# Patient Record
Sex: MALE | Race: WHITE | Employment: FULL TIME | URBAN - METROPOLITAN AREA
[De-identification: names, ages, dates, MRNs, and addresses within clinical notes are randomized per-mention and may not be internally consistent; named-entity substitution may affect disease eponyms.]

---

## 2018-10-17 ENCOUNTER — APPOINTMENT (OUTPATIENT)
Dept: GENERAL RADIOLOGY | Age: 33
End: 2018-10-17
Payer: COMMERCIAL

## 2018-10-17 ENCOUNTER — HOSPITAL ENCOUNTER (EMERGENCY)
Age: 33
Discharge: HOME | End: 2018-10-17
Attending: EMERGENCY MEDICINE
Payer: COMMERCIAL

## 2018-10-17 VITALS
HEART RATE: 76 BPM | TEMPERATURE: 98.2 F | HEIGHT: 77 IN | BODY MASS INDEX: 21.84 KG/M2 | DIASTOLIC BLOOD PRESSURE: 77 MMHG | WEIGHT: 185 LBS | SYSTOLIC BLOOD PRESSURE: 133 MMHG | RESPIRATION RATE: 16 BRPM | OXYGEN SATURATION: 100 %

## 2018-10-17 DIAGNOSIS — S62.616B OPEN DISPLACED FRACTURE OF PROXIMAL PHALANX OF RIGHT LITTLE FINGER, INITIAL ENCOUNTER: ICD-10-CM

## 2018-10-17 DIAGNOSIS — S62.614A CLOSED DISPLACED FRACTURE OF PROXIMAL PHALANX OF RIGHT RING FINGER, INITIAL ENCOUNTER: Primary | ICD-10-CM

## 2018-10-17 PROCEDURE — 6360000002 HC RX W HCPCS: Performed by: NURSE PRACTITIONER

## 2018-10-17 PROCEDURE — 99283 EMERGENCY DEPT VISIT LOW MDM: CPT

## 2018-10-17 PROCEDURE — 73130 X-RAY EXAM OF HAND: CPT

## 2018-10-17 PROCEDURE — 12001 RPR S/N/AX/GEN/TRNK 2.5CM/<: CPT

## 2018-10-17 PROCEDURE — 90715 TDAP VACCINE 7 YRS/> IM: CPT | Performed by: NURSE PRACTITIONER

## 2018-10-17 PROCEDURE — 90471 IMMUNIZATION ADMIN: CPT | Performed by: NURSE PRACTITIONER

## 2018-10-17 RX ORDER — HYDROCODONE BITARTRATE AND ACETAMINOPHEN 5; 325 MG/1; MG/1
2 TABLET ORAL ONCE
Status: DISCONTINUED | OUTPATIENT
Start: 2018-10-17 | End: 2018-10-17

## 2018-10-17 RX ORDER — LIDOCAINE HYDROCHLORIDE 10 MG/ML
20 INJECTION, SOLUTION INFILTRATION; PERINEURAL ONCE
Status: DISCONTINUED | OUTPATIENT
Start: 2018-10-17 | End: 2018-10-17

## 2018-10-17 RX ORDER — HYDROCODONE BITARTRATE AND ACETAMINOPHEN 5; 325 MG/1; MG/1
1 TABLET ORAL EVERY 6 HOURS PRN
Qty: 12 TABLET | Refills: 0 | Status: SHIPPED | OUTPATIENT
Start: 2018-10-17 | End: 2018-10-20

## 2018-10-17 RX ORDER — CEPHALEXIN 500 MG/1
500 CAPSULE ORAL 4 TIMES DAILY
Qty: 28 CAPSULE | Refills: 0 | Status: SHIPPED | OUTPATIENT
Start: 2018-10-17 | End: 2018-10-24

## 2018-10-17 RX ORDER — CEPHALEXIN 250 MG/1
500 CAPSULE ORAL ONCE
Status: DISCONTINUED | OUTPATIENT
Start: 2018-10-17 | End: 2018-10-17 | Stop reason: HOSPADM

## 2018-10-17 RX ORDER — LIDOCAINE HYDROCHLORIDE 10 MG/ML
20 INJECTION, SOLUTION EPIDURAL; INFILTRATION; INTRACAUDAL; PERINEURAL ONCE
Status: DISCONTINUED | OUTPATIENT
Start: 2018-10-17 | End: 2018-10-17 | Stop reason: HOSPADM

## 2018-10-17 RX ADMIN — TETANUS TOXOID, REDUCED DIPHTHERIA TOXOID AND ACELLULAR PERTUSSIS VACCINE, ADSORBED 0.5 ML: 5; 2.5; 8; 8; 2.5 SUSPENSION INTRAMUSCULAR at 14:04

## 2018-10-17 ASSESSMENT — PAIN DESCRIPTION - DESCRIPTORS: DESCRIPTORS: THROBBING

## 2018-10-17 ASSESSMENT — ENCOUNTER SYMPTOMS
TROUBLE SWALLOWING: 0
NAUSEA: 0
COUGH: 0
VOMITING: 0
SHORTNESS OF BREATH: 0

## 2018-10-17 ASSESSMENT — PAIN DESCRIPTION - LOCATION: LOCATION: FINGER (COMMENT WHICH ONE)

## 2018-10-17 ASSESSMENT — PAIN DESCRIPTION - ORIENTATION: ORIENTATION: RIGHT

## 2018-10-17 ASSESSMENT — PAIN SCALES - GENERAL: PAINLEVEL_OUTOF10: 10

## 2018-10-17 NOTE — ED PROVIDER NOTES
[address]  eMERGENCY dEPARTMENT eNCOUnter      Pt Name: Gigi Miramontes  MRN: [id number]  [place] 1985  Date of evaluation: 10/17/2018  Provider: MATHIEU Moeller [id number]       Chief Complaint   Patient presents with    Hand Injury         HISTORY OF PRESENT ILLNESS  (Location/Symptom, Timing/Onset, Context/Setting, Quality, Duration,Modifying Factors, Severity.)   Gigi Miramontes is a 29 y.o. male who presents to the emergency department for evaluation of laceration:     Location/Symptom:Laceration to right pinky finger  Timing/Onset: PTA  Context/Setting: Patient Presents to ED for evaluation of right pinky finger injury. Patient states he was working on a truck and branch fell on it. Patient reports laceration to the base of right pinky finger and deformity. States he can feel crunching inside his finger. States he can move it but not fully extend the finger. No numbness or tingling. Tetanus shot is not up-to-date. Bleeding controlled with pressure. He is right-hand dominant. Quality:sharp, achy  Duration: constant  ModifyingFactors: worse with movement  Severity: moderate    Nursing Notes were reviewed and I agree. REVIEW OF SYSTEMS    (2-9systems for level 4, 10 or more for level 5)     Review of Systems   Constitutional: Negative for chills and fever. HENT: Negative for trouble swallowing. Respiratory: Negative for cough and shortness of breath. Cardiovascular: Negative for chest pain and palpitations. Gastrointestinal: Negative for nausea and vomiting. Musculoskeletal:        Right pinky finger injury   Skin: Positive for wound. Except as noted above the remainder of the review of systems wasreviewed and negative. PAST MEDICAL HISTORY         Diagnosis Date    Depressive disorder, not elsewhere classified 3/21/2015     Reviewed. SURGICALHISTORY     History reviewed. No pertinent surgical history. Reviewed.   CURRENT MEDICATIONS is not diaphoretic. Psychiatric: He has a normal mood and affect. His behavior is normal.       DIAGNOSTIC RESULTS     RADIOLOGY:   [] Visualized by me  And Serjio Joyce MD   Xr Hand Right (min 3 Views)    Result Date: 10/17/2018  EXAMINATION: 3 XRAY VIEWS OF THE RIGHT HAND 10/17/2018 2:29 pm COMPARISON: None. HISTORY: ORDERING SYSTEM PROVIDED HISTORY: Post reduction TECHNOLOGIST PROVIDED HISTORY: Post reduction Ordering Physician Provided Reason for Exam: Post reduction of right hand. Acuity: Acute Type of Exam: Ongoing FINDINGS: Stable comminuted fracture of the 4th proximal phalanx. Slightly improved comminuted angulated fracture of the 5th proximal phalanx. No new fractures. No dislocation. 1. Stable comminuted fracture of the 4th proximal phalanx 2. Slightly less angulated comminuted fracture of the 5th proximal phalanx     Xr Hand Right (min 3 Views)    Result Date: 10/17/2018  EXAMINATION: 3 XRAY VIEWS OF THE RIGHT HAND 10/17/2018 1:37 pm COMPARISON: None. HISTORY: ORDERING SYSTEM PROVIDED HISTORY: right pinky finger injury TECHNOLOGIST PROVIDED HISTORY: right pinky finger injury Ordering Physician Provided Reason for Exam: Injury to right hand while working today Acuity: Acute Type of Exam: Initial Mechanism of Injury: Injury to right hand while working today FINDINGS: Comminuted minimally displaced fracture of the 4th proximal phalanx with intra-articular extension to the MCP joint. Comminuted displaced and angulated 5th proximal phalangeal fracture without clear intra-articular extension. There is approximately 90 degrees angulation between the proximal and distal fracture components. Remaining bones of the hand are intact and in anatomic alignment. Soft tissue swelling about the 4th and 5th fingers. 4th and 5th proximal phalangeal fractures, as above.          LABS:  Labs Reviewed - No data to display    All other labs were within normal range or not returned as of this

## 2018-10-17 NOTE — ED PROVIDER NOTES
[address]  eMERGENCY dEPARTMENT eNCOUnter   Attending Attestation     Pt Name: Mingo Galindo  MRN: [id number]  [place] 1985  Date of evaluation: 10/17/18       Mingo Galindo is a 29 y.o. male who presents with Hand Injury      History:   Patient is here because he got his hand crushed. Patient is laceration and deformity of his little finger. Patient does have pain in the finger but no tingling. Exam: Vitals:   Vitals:    10/17/18 1332 10/17/18 1336   BP: 133/77    Pulse: 76 76   Resp: 16    Temp: 98.2 °F (36.8 °C)    TempSrc: Oral    SpO2: 100%    Weight: 185 lb (83.9 kg)    Height: 6' 5\" (1.956 m)      Patient has a open laceration base of the pinky finger with a deformity dorsally. Patient has decreased sensation and cap refill in the pinky. After reduction patient's cap refill got better and patient states it feels better. I performed a history and physical examination of the patient and discussed management with the resident. I reviewed the residents note and agree with the documented findings and plan of care. Any areas of disagreement are noted on the chart. I was personally present for the key portions of any procedures. I have documented in the chart those procedures where I was not present during the key portions. I have personally reviewed all images and agree with the resident's interpretation. I have reviewed the emergency nurses triage note. I agree with the chief complaint, past medical history, past surgical history, allergies, medications, social and family history as documented unless otherwise noted below. Documentation of the HPI, Physical Exam and Medical Decision Making performed by medical students or scribes is based on my personal performance of the HPI, PE and MDM. I personally evaluated and examined the patient in conjunction with the APC and agree with the assessment, treatment plan, and disposition of the patient as recorded by the APC.  Additional

## 2024-01-15 ENCOUNTER — HOSPITAL ENCOUNTER (OUTPATIENT)
Age: 39
Discharge: HOME | End: 2024-01-17
Payer: COMMERCIAL

## 2024-01-15 ENCOUNTER — HOSPITAL ENCOUNTER (OUTPATIENT)
Age: 39
End: 2024-01-15
Payer: COMMERCIAL

## 2024-01-15 ENCOUNTER — HOSPITAL ENCOUNTER (OUTPATIENT)
Dept: GENERAL RADIOLOGY | Age: 39
Discharge: HOME | End: 2024-01-17
Payer: COMMERCIAL

## 2024-01-15 DIAGNOSIS — M25.522 LEFT ELBOW PAIN: ICD-10-CM

## 2024-01-15 PROCEDURE — 73080 X-RAY EXAM OF ELBOW: CPT

## 2024-02-22 ENCOUNTER — TELEPHONE (OUTPATIENT)
Age: 39
End: 2024-02-22

## 2024-02-23 ENCOUNTER — TELEPHONE (OUTPATIENT)
Age: 39
End: 2024-02-23